# Patient Record
Sex: FEMALE | NOT HISPANIC OR LATINO | Employment: STUDENT | URBAN - METROPOLITAN AREA
[De-identification: names, ages, dates, MRNs, and addresses within clinical notes are randomized per-mention and may not be internally consistent; named-entity substitution may affect disease eponyms.]

---
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
## 2024-10-02 ENCOUNTER — OFFICE VISIT (OUTPATIENT)
Dept: FAMILY MEDICINE CLINIC | Facility: CLINIC | Age: 19
End: 2024-10-02

## 2024-10-02 VITALS — BODY MASS INDEX: 34.99 KG/M2 | TEMPERATURE: 98.9 F | WEIGHT: 210 LBS | HEIGHT: 65 IN | OXYGEN SATURATION: 98 %

## 2024-10-02 DIAGNOSIS — J06.9 VIRAL UPPER RESPIRATORY TRACT INFECTION: Primary | ICD-10-CM

## 2024-10-02 PROCEDURE — 99213 OFFICE O/P EST LOW 20 MIN: CPT | Performed by: PHYSICIAN ASSISTANT

## 2024-10-02 NOTE — PROGRESS NOTES
".Chief Complaint  Sore Throat    Subjective          History of Present Illness  Edie Goldstein is here today with her  History of Present Illness  The patient presents for evaluation of a sore throat.    She is experiencing a severe sore throat accompanied by pain and congestion. Her symptoms began yesterday, with the sore throat intensifying last night, causing significant discomfort and weakness. She also reports body aches, which is unusual for her at this stage of illness. Additionally, she has nasal congestion and a runny nose.     She reports no cough or wheezing. She has a headache and experienced mild nausea this morning. She reports no diarrhea. She has been managing her symptoms with ibuprofen and DayQuil.    She has no known drug allergies, although she had a reaction to Augmentin in the past. She uses an albuterol inhaler as needed during exercise and is currently on Vyvanse.    Objective   Vital Signs:   Temp 98.9 °F (37.2 °C)   Ht 163.8 cm (64.5\")   Wt 95.3 kg (210 lb)   SpO2 98%   BMI 35.49 kg/m²     Body mass index is 35.49 kg/m².  BMI is >= 30 and <35. (Class 1 Obesity). The following options were offered after discussion;: information on healthy weight added to patient's after visit summary       Review of Systems      Current Outpatient Medications:   •  albuterol sulfate  (90 Base) MCG/ACT inhaler, Inhale 2 puffs Every 4 (Four) Hours As Needed for Wheezing., Disp: , Rfl:   •  lisdexamfetamine (Vyvanse) 50 MG capsule, Take 1 capsule by mouth Every Morning, Disp: , Rfl:   •  levocetirizine (XYZAL) 5 MG tablet, Take 1 tablet by mouth Every Evening for 30 days., Disp: 30 tablet, Rfl: 0    Allergies: Patient has no known allergies.    Physical Exam  Vitals and nursing note reviewed.   Constitutional:       General: She is not in acute distress.     Appearance: She is not ill-appearing, toxic-appearing or diaphoretic.   HENT:      Head: Normocephalic and atraumatic.      Right Ear: Tympanic " membrane, ear canal and external ear normal.      Left Ear: Tympanic membrane, ear canal and external ear normal.      Nose: Congestion present.      Mouth/Throat:      Mouth: Mucous membranes are moist.      Pharynx: No posterior oropharyngeal erythema.      Comments: Clear post nasal drainage  Eyes:      Pupils: Pupils are equal, round, and reactive to light.   Cardiovascular:      Rate and Rhythm: Normal rate and regular rhythm.      Heart sounds: Normal heart sounds.   Pulmonary:      Effort: Pulmonary effort is normal.      Breath sounds: Normal breath sounds.   Neurological:      General: No focal deficit present.      Mental Status: She is alert.   Psychiatric:         Mood and Affect: Mood normal.         Behavior: Behavior normal.      Physical Exam      Result Review :          Results  Strep flu and COVID are negative              Assessment and Plan    Diagnoses and all orders for this visit:    1. Viral upper respiratory tract infection (Primary)    Patient tested negative for COVID flu and strep.  She is advised to continue taking DayQuil to alleviate her symptoms. Ibuprofen can be taken for pain relief.  Kely with the patient that it was very early in her symptoms for us to be sure that we have ruled out COVID flu and strep definitively.  If her symptoms are worsening over the next couple of days she begins to run a fever gets discolored drainage or increasing cough she is to return to our clinic or seek medical care elsewhere.  Assessment & Plan        Follow Up   No follow-ups on file.  Patient was given instructions and counseling regarding her condition or for health maintenance advice. Please see specific information pulled into the AVS if appropriate.     Patient or patient representative verbalized consent for the use of Ambient Listening during the visit with  OREN Castrejon for chart documentation. 10/2/2024  10:40 EDT    OREN Castrejon  10/02/2024

## 2025-02-10 ENCOUNTER — PATIENT ROUNDING (BHMG ONLY) (OUTPATIENT)
Dept: URGENT CARE | Facility: CLINIC | Age: 20
End: 2025-02-10
Payer: COMMERCIAL

## 2025-04-02 ENCOUNTER — OFFICE VISIT (OUTPATIENT)
Dept: FAMILY MEDICINE CLINIC | Facility: CLINIC | Age: 20
End: 2025-04-02

## 2025-04-02 VITALS
BODY MASS INDEX: 34.99 KG/M2 | RESPIRATION RATE: 18 BRPM | SYSTOLIC BLOOD PRESSURE: 124 MMHG | OXYGEN SATURATION: 98 % | HEIGHT: 65 IN | WEIGHT: 210 LBS | HEART RATE: 86 BPM | DIASTOLIC BLOOD PRESSURE: 74 MMHG

## 2025-04-02 DIAGNOSIS — J45.20 MILD INTERMITTENT ASTHMA, UNSPECIFIED WHETHER COMPLICATED: ICD-10-CM

## 2025-04-02 DIAGNOSIS — J30.1 SEASONAL ALLERGIC RHINITIS DUE TO POLLEN: Primary | ICD-10-CM

## 2025-04-02 PROBLEM — M25.562 ACUTE PAIN OF LEFT KNEE: Status: ACTIVE | Noted: 2021-06-23

## 2025-04-02 RX ORDER — IPRATROPIUM BROMIDE 21 UG/1
2 SPRAY, METERED NASAL EVERY 12 HOURS
Qty: 30 ML | Refills: 1 | Status: SHIPPED | OUTPATIENT
Start: 2025-04-02

## 2025-04-02 RX ORDER — PSEUDOEPHEDRINE HCL 120 MG/1
120 TABLET, FILM COATED, EXTENDED RELEASE ORAL EVERY 12 HOURS
Qty: 30 TABLET | Refills: 0 | Status: SHIPPED | OUTPATIENT
Start: 2025-04-02

## 2025-04-02 NOTE — PROGRESS NOTES
".Chief Complaint  Shortness of Breath (When walking; no cough had sore throat days ago gone now) and Fatigue    Subjective          History of Present Illness  Edie Goldstein is here today with her  History of Present Illness  The patient presents for evaluation of a scratchy throat.    She began experiencing symptoms on Sunday, initially presenting as a scratchy throat, which she attributed to allergies. However, her condition has since deteriorated, with the onset of throat pain, headache, and nasal congestion. She also reports mild shortness of breath, particularly noticeable during physical exertion such as walking that she noticed just today on the way to the clinic.. She has not experienced any coughing. Her primary complaint is fatigue. She has been managing her symptoms with Zyrtec but did not take it this morning. She has previously used Sudafed without any adverse reactions. She has a known history of allergies and asthma and has increased her use of albuterol today. It is noteworthy that her mother recently recovered from COVID-19 and she was with her over the weekend before the symptoms started    MEDICATIONS  Current: Zyrtec, albuterol    Objective   Vital Signs:   /74 (BP Location: Left arm, Patient Position: Sitting, Cuff Size: Large Adult)   Pulse 86   Resp 18   Ht 165.1 cm (65\")   Wt 95.3 kg (210 lb)   SpO2 98%   BMI 34.95 kg/m²     Body mass index is 34.95 kg/m².         Review of Systems      Current Outpatient Medications:   •  albuterol sulfate  (90 Base) MCG/ACT inhaler, Inhale 2 puffs Every 6 (Six) Hours As Needed for Wheezing., Disp: 18 g, Rfl: 0  •  lisdexamfetamine (Vyvanse) 40 MG capsule, Take 1 capsule every day by oral route in the morning for 30 days., Disp: , Rfl:   •  ipratropium (ATROVENT) 0.03 % nasal spray, Administer 2 sprays into the nostril(s) as directed by provider Every 12 (Twelve) Hours., Disp: 30 mL, Rfl: 1  •  pseudoephedrine (SUDAFED) 120 MG 12 hr tablet, " Take 1 tablet by mouth Every 12 (Twelve) Hours., Disp: 30 tablet, Rfl: 0    Allergies: Patient has no known allergies.    Physical Exam  Vitals and nursing note reviewed.   Constitutional:       General: She is not in acute distress.     Appearance: Normal appearance. She is not ill-appearing, toxic-appearing or diaphoretic.   HENT:      Head: Normocephalic and atraumatic.      Right Ear: There is impacted cerumen.      Left Ear: There is impacted cerumen.      Nose: Congestion present.      Mouth/Throat:      Mouth: Mucous membranes are moist.      Pharynx: No posterior oropharyngeal erythema.      Comments: Clear post nasal drainage  Eyes:      Pupils: Pupils are equal, round, and reactive to light.   Cardiovascular:      Rate and Rhythm: Normal rate and regular rhythm.      Heart sounds: Normal heart sounds.   Pulmonary:      Effort: Pulmonary effort is normal.      Breath sounds: Normal breath sounds.   Skin:     General: Skin is warm.   Neurological:      General: No focal deficit present.      Mental Status: She is alert.   Psychiatric:         Mood and Affect: Mood normal.         Behavior: Behavior normal.      Physical Exam      Result Review :          Results  Laboratory Studies  Influenza and COVID-19 tests are both negative.             Assessment and Plan    Diagnoses and all orders for this visit:    1. Seasonal allergic rhinitis due to pollen (Primary)    2. Mild intermittent asthma, unspecified whether complicated    Other orders  -     ipratropium (ATROVENT) 0.03 % nasal spray; Administer 2 sprays into the nostril(s) as directed by provider Every 12 (Twelve) Hours.  Dispense: 30 mL; Refill: 1  -     pseudoephedrine (SUDAFED) 120 MG 12 hr tablet; Take 1 tablet by mouth Every 12 (Twelve) Hours.  Dispense: 30 tablet; Refill: 0      Assessment & Plan    Her symptoms do not align with a diagnosis of strep throat. Influenza and COVID-19 tests are both negative. A decongestant, Sudafed, will be added to  her current regimen. Additionally, a nasal spray will be prescribed to alleviate postnasal drip. She is advised to use albuterol as needed for shortness of breath. A doctor's note will be provided for today. If she experiences any unusual side effects from Sudafed or if her symptoms worsen significantly, she should inform us immediately.      Follow Up   No follow-ups on file.  Patient was given instructions and counseling regarding her condition or for health maintenance advice. Please see specific information pulled into the AVS if appropriate.     Patient or patient representative verbalized consent for the use of Ambient Listening during the visit with  OREN Castrejon for chart documentation. 4/2/2025  12:36 EDT    OREN Castrejon  04/02/2025

## 2025-04-02 NOTE — LETTER
April 2, 2025     Patient: Edie Goldstein   YOB: 2005   Date of Visit: 4/2/2025       To Whom it May Concern:    Edie Goldstein was seen in my clinic on 4/2/2025. She  may return to school in one day.           Sincerely,          Latonya Juarez PA-C        CC: No Recipients